# Patient Record
Sex: FEMALE | Race: BLACK OR AFRICAN AMERICAN | Employment: FULL TIME | URBAN - METROPOLITAN AREA
[De-identification: names, ages, dates, MRNs, and addresses within clinical notes are randomized per-mention and may not be internally consistent; named-entity substitution may affect disease eponyms.]

---

## 2017-04-24 ENCOUNTER — DOCUMENTATION ONLY (OUTPATIENT)
Dept: BARIATRICS/WEIGHT MGMT | Age: 32
End: 2017-04-24

## 2017-04-24 NOTE — PROGRESS NOTES
4/24/17:  Patient called and stated she believes she is pregnant. She has an appointment with her OBGYN on May 1 to confirm. She is 3 year post gastric sleeve. She states she is maintaining her weight well. I have discussed with her the vitamin protocol and informed her of the new recommendations of for Vitamin D 3 and Vitamin B 12. Talked with her about doing her 2 Flintstones a day and adding in 65 mg of iron. In terms of eating, I have encouraged her to stick to 6 small meals, filling up on protein, vegetables, good fat, and a small amounts of fruit. Patient understood instructions. I offered a 1-1 visit for her to come in, but she stated that work in busy now, but she would call back with any additional questions.     Anitha Vasquez MS RD

## 2017-10-02 ENCOUNTER — DOCUMENTATION ONLY (OUTPATIENT)
Dept: BARIATRICS/WEIGHT MGMT | Age: 32
End: 2017-10-02

## 2018-09-26 ENCOUNTER — DOCUMENTATION ONLY (OUTPATIENT)
Dept: SURGERY | Age: 33
End: 2018-09-26

## 2018-09-26 NOTE — PROGRESS NOTES
Per [facility] requirements;  E-mail and letter sent for follow up appointment. Nini Hemphill 89      Dear Patient,    Your health is our main concern. It is important for your health to have follow-up lab work and to see you surgeon at 3 months, 6 months and annually after your weight loss surgery. Additionally, the Department of bariatric Surgery at our hospital is a member of the [employer] [address] Surgical Quality Improvement Program ([facility] NSQIP). As a participant in this program, we gather information on the outcomes of our patients after surgery. Please call the office for a follow up appointment at [phone] with JOSE Vale. If you have moved out of the area or have changed surgeons please call us and let us know the name of your doctor. Your health and feedback are important to us. We greatly appreciate your response.        Thank you,  Nini Moffett [facility]

## 2018-09-26 NOTE — LETTER
[address] [place] [address] 240 200 [facility] 
[phone] [facility] Dear Patient, Your health is our main concern. It is important for your health to have follow-up lab work and to see you surgeon at 3 months, 6 months and annually after your weight loss surgery. Additionally, the Department of bariatric Surgery at our hospital is a member of the [employer] of [address] [place] [address] Surgical Quality Improvement Program ([facility] NSQIP). As a participant in this program, we gather information on the outcomes of our patients after surgery. Please call the office for a follow up appointment at [phone] with JOSE Cortez. If you have moved out of the area or have changed surgeons please call us and let us know the name of your doctor. Your health and feedback are important to us. We greatly appreciate your response. Thank you, Pamela Mckinney [facility] [address]